# Patient Record
Sex: FEMALE | Race: AMERICAN INDIAN OR ALASKA NATIVE | NOT HISPANIC OR LATINO | URBAN - METROPOLITAN AREA
[De-identification: names, ages, dates, MRNs, and addresses within clinical notes are randomized per-mention and may not be internally consistent; named-entity substitution may affect disease eponyms.]

---

## 2021-09-16 ENCOUNTER — OFFICE VISIT (OUTPATIENT)
Dept: URBAN - METROPOLITAN AREA CLINIC 60 | Facility: CLINIC | Age: 31
End: 2021-09-16

## 2021-09-21 ENCOUNTER — LAB OUTSIDE AN ENCOUNTER (OUTPATIENT)
Dept: URBAN - METROPOLITAN AREA CLINIC 121 | Facility: CLINIC | Age: 31
End: 2021-09-21

## 2021-09-30 ENCOUNTER — OFFICE VISIT (OUTPATIENT)
Dept: URBAN - METROPOLITAN AREA SURGERY CENTER 4 | Facility: SURGERY CENTER | Age: 31
End: 2021-09-30

## 2021-09-30 LAB
A/G RATIO: 1.7
ALBUMIN: (no result)
ALKALINE PHOSPHATASE: (no result)
ALT (SGPT): (no result)
AST (SGOT): (no result)
BASO (ABSOLUTE): (no result)
BASOS: (no result)
BILIRUBIN, TOTAL: (no result)
BUN/CREATININE RATIO: 18
BUN: (no result)
CALCIUM: (no result)
CARBON DIOXIDE, TOTAL: (no result)
CHLORIDE: (no result)
CREATININE: (no result)
EGFR IF AFRICN AM: (no result)
EGFR IF NONAFRICN AM: (no result)
EOS (ABSOLUTE): (no result)
EOS: (no result)
GLOBULIN, TOTAL: (no result)
GLUCOSE: (no result)
HEMATOCRIT: (no result)
HEMATOLOGY COMMENTS:: (no result)
HEMOGLOBIN: (no result)
IMMATURE CELLS: (no result)
IMMATURE GRANS (ABS): (no result)
IMMATURE GRANULOCYTES: (no result)
LYMPHS (ABSOLUTE): (no result)
LYMPHS: (no result)
MCH: (no result)
MCHC: (no result)
MCV: (no result)
MONOCYTES(ABSOLUTE): (no result)
MONOCYTES: (no result)
NEUTROPHILS (ABSOLUTE): (no result)
NEUTROPHILS: (no result)
NRBC: (no result)
PLATELETS: (no result)
POTASSIUM: (no result)
PROTEIN, TOTAL: (no result)
RBC: (no result)
RDW: (no result)
SODIUM: (no result)
WBC: (no result)

## 2021-10-04 LAB — PATHOLOGY (INDENTED REPORT): (no result)

## 2021-10-14 ENCOUNTER — OFFICE VISIT (OUTPATIENT)
Dept: URBAN - METROPOLITAN AREA CLINIC 60 | Facility: CLINIC | Age: 31
End: 2021-10-14

## 2021-11-18 ENCOUNTER — OFFICE VISIT (OUTPATIENT)
Dept: URBAN - METROPOLITAN AREA CLINIC 60 | Facility: CLINIC | Age: 31
End: 2021-11-18

## 2021-11-30 ENCOUNTER — OFFICE VISIT (OUTPATIENT)
Dept: URBAN - METROPOLITAN AREA SURGERY CENTER 4 | Facility: SURGERY CENTER | Age: 31
End: 2021-11-30

## 2021-12-16 ENCOUNTER — OFFICE VISIT (OUTPATIENT)
Dept: URBAN - METROPOLITAN AREA CLINIC 60 | Facility: CLINIC | Age: 31
End: 2021-12-16

## 2022-07-09 ENCOUNTER — TELEPHONE ENCOUNTER (OUTPATIENT)
Dept: URBAN - METROPOLITAN AREA CLINIC 121 | Facility: CLINIC | Age: 32
End: 2022-07-09

## 2022-07-09 RX ORDER — PANTOPRAZOLE SODIUM 40 MG/1
TAKE ONCE DAILY PRIOR TO EVENING MEAL TABLET, DELAYED RELEASE ORAL TAKE AS DIRECTED
Refills: 0 | OUTPATIENT
Start: 2021-09-16 | End: 2021-12-10

## 2022-07-09 RX ORDER — OMEPRAZOLE 20 MG/1
CAPSULE, DELAYED RELEASE ORAL TAKE AS DIRECTED
Refills: 0 | OUTPATIENT
Start: 2018-02-26 | End: 2021-10-14

## 2022-07-09 RX ORDER — OMEPRAZOLE 20 MG/1
CAPSULE, DELAYED RELEASE ORAL TAKE AS DIRECTED
Refills: 0 | OUTPATIENT
Start: 2021-10-14 | End: 2021-10-14

## 2022-07-09 RX ORDER — OMEPRAZOLE 20 MG/1
CAPSULE, DELAYED RELEASE ORAL TAKE AS DIRECTED
Refills: 0 | OUTPATIENT
Start: 2018-01-24 | End: 2018-02-26

## 2022-07-09 RX ORDER — FAMOTIDINE 20 MG/1
ONCE A DAY 30-60 MINUTES PRIOR TO BREAKFAST TABLET ORAL ONCE A DAY
Refills: 0 | OUTPATIENT
Start: 2021-09-16 | End: 2021-12-10

## 2022-07-10 ENCOUNTER — TELEPHONE ENCOUNTER (OUTPATIENT)
Dept: URBAN - METROPOLITAN AREA CLINIC 121 | Facility: CLINIC | Age: 32
End: 2022-07-10

## 2022-07-10 RX ORDER — FAMOTIDINE 20 MG/1
ONCE A DAY TABLET ORAL ONCE A DAY
Refills: 2 | Status: ACTIVE | COMMUNITY
Start: 2021-12-16

## 2022-07-10 RX ORDER — FAMOTIDINE 20 MG/1
ONCE A DAY TABLET ORAL ONCE A DAY
Refills: 0 | Status: ACTIVE | COMMUNITY
Start: 2021-12-10

## 2022-07-10 RX ORDER — PANTOPRAZOLE SODIUM 40 MG/1
ONCE A DAY TABLET, DELAYED RELEASE ORAL ONCE A DAY
Refills: 0 | Status: ACTIVE | COMMUNITY
Start: 2021-12-10

## 2022-08-24 ENCOUNTER — APPOINTMENT (RX ONLY)
Dept: URBAN - METROPOLITAN AREA CLINIC 335 | Facility: CLINIC | Age: 32
Setting detail: DERMATOLOGY
End: 2022-08-24

## 2022-08-24 DIAGNOSIS — L30.1 DYSHIDROSIS [POMPHOLYX]: ICD-10-CM | Status: INADEQUATELY CONTROLLED

## 2022-08-24 DIAGNOSIS — L21.8 OTHER SEBORRHEIC DERMATITIS: ICD-10-CM | Status: INADEQUATELY CONTROLLED

## 2022-08-24 PROCEDURE — ? RECOMMENDATIONS

## 2022-08-24 PROCEDURE — 99204 OFFICE O/P NEW MOD 45 MIN: CPT

## 2022-08-24 PROCEDURE — ? COUNSELING

## 2022-08-24 PROCEDURE — ? PRESCRIPTION

## 2022-08-24 RX ORDER — CLOBETASOL PROPIONATE 0.5 MG/G
OINTMENT TOPICAL
Qty: 45 | Refills: 1 | Status: ERX | COMMUNITY
Start: 2022-08-24

## 2022-08-24 RX ORDER — FLUOCINONIDE 0.5 MG/ML
SOLUTION TOPICAL
Qty: 60 | Refills: 3 | Status: ERX | COMMUNITY
Start: 2022-08-24

## 2022-08-24 RX ORDER — KETOCONAZOLE 20 MG/ML
SHAMPOO, SUSPENSION TOPICAL
Qty: 120 | Refills: 1 | Status: ERX | COMMUNITY
Start: 2022-08-24

## 2022-08-24 RX ORDER — HYDROCORTISONE 25 MG/G
CREAM TOPICAL
Qty: 30 | Refills: 1 | Status: ERX | COMMUNITY
Start: 2022-08-24

## 2022-08-24 RX ORDER — KETOCONAZOLE 20 MG/G
CREAM TOPICAL BID
Qty: 1 | Refills: 1 | Status: ERX | COMMUNITY
Start: 2022-08-24

## 2022-08-24 RX ADMIN — KETOCONAZOLE: 20 SHAMPOO, SUSPENSION TOPICAL at 00:00

## 2022-08-24 RX ADMIN — KETOCONAZOLE: 20 CREAM TOPICAL at 00:00

## 2022-08-24 RX ADMIN — HYDROCORTISONE: 25 CREAM TOPICAL at 00:00

## 2022-08-24 RX ADMIN — FLUOCINONIDE: 0.5 SOLUTION TOPICAL at 00:00

## 2022-08-24 RX ADMIN — CLOBETASOL PROPIONATE: 0.5 OINTMENT TOPICAL at 00:00

## 2022-08-24 ASSESSMENT — LOCATION ZONE DERM
LOCATION ZONE: HAND
LOCATION ZONE: SCALP
LOCATION ZONE: FACE

## 2022-08-24 ASSESSMENT — LOCATION DETAILED DESCRIPTION DERM
LOCATION DETAILED: HAIR
LOCATION DETAILED: RIGHT RADIAL DORSAL HAND
LOCATION DETAILED: RIGHT MEDIAL EYEBROW
LOCATION DETAILED: LEFT DORSAL RING METACARPOPHALANGEAL JOINT

## 2022-08-24 ASSESSMENT — LOCATION SIMPLE DESCRIPTION DERM
LOCATION SIMPLE: RIGHT EYEBROW
LOCATION SIMPLE: HAIR
LOCATION SIMPLE: LEFT HAND
LOCATION SIMPLE: RIGHT HAND

## 2022-08-24 NOTE — PROCEDURE: RECOMMENDATIONS
Detail Level: Zone
Render Risk Assessment In Note?: no
Recommendation Preamble: The following recommendations were made during the visit:follow up with PCP to rule out possible arthritis

## 2022-10-05 ENCOUNTER — APPOINTMENT (RX ONLY)
Dept: URBAN - METROPOLITAN AREA CLINIC 335 | Facility: CLINIC | Age: 32
Setting detail: DERMATOLOGY
End: 2022-10-05

## 2022-10-05 ENCOUNTER — RX ONLY (OUTPATIENT)
Age: 32
Setting detail: RX ONLY
End: 2022-10-05

## 2022-10-05 DIAGNOSIS — L21.8 OTHER SEBORRHEIC DERMATITIS: ICD-10-CM | Status: IMPROVED

## 2022-10-05 DIAGNOSIS — L30.1 DYSHIDROSIS [POMPHOLYX]: ICD-10-CM | Status: INADEQUATELY CONTROLLED

## 2022-10-05 PROCEDURE — 99214 OFFICE O/P EST MOD 30 MIN: CPT

## 2022-10-05 PROCEDURE — ? PRESCRIPTION MEDICATION MANAGEMENT

## 2022-10-05 PROCEDURE — ? RECOMMENDATIONS

## 2022-10-05 PROCEDURE — ? COUNSELING

## 2022-10-05 RX ORDER — KETOCONAZOLE 20 MG/ML
SHAMPOO, SUSPENSION TOPICAL
Qty: 120 | Refills: 1 | Status: ERX

## 2022-10-05 ASSESSMENT — LOCATION ZONE DERM
LOCATION ZONE: SCALP
LOCATION ZONE: FACE
LOCATION ZONE: HAND

## 2022-10-05 ASSESSMENT — LOCATION SIMPLE DESCRIPTION DERM
LOCATION SIMPLE: RIGHT HAND
LOCATION SIMPLE: HAIR
LOCATION SIMPLE: LEFT HAND
LOCATION SIMPLE: RIGHT EYEBROW

## 2022-10-05 ASSESSMENT — LOCATION DETAILED DESCRIPTION DERM
LOCATION DETAILED: RIGHT RADIAL DORSAL HAND
LOCATION DETAILED: LEFT DORSAL RING METACARPOPHALANGEAL JOINT
LOCATION DETAILED: HAIR
LOCATION DETAILED: RIGHT MEDIAL EYEBROW

## 2022-10-05 NOTE — PROCEDURE: PRESCRIPTION MEDICATION MANAGEMENT
Detail Level: Zone
Render In Strict Bullet Format?: No
Continue Regimen: Ketoconazole shampoo - tiw \\nKetoconazole cream - bid\\nFluocinonide- tiw
Continue Regimen: Clobetasol diluted with moisterizer - tiw\\nHydrocortisone- bid tiw

## 2023-03-07 ENCOUNTER — RX ONLY (OUTPATIENT)
Age: 33
Setting detail: RX ONLY
End: 2023-03-07

## 2023-03-07 RX ORDER — KETOCONAZOLE 20 MG/ML
SHAMPOO, SUSPENSION TOPICAL
Qty: 120 | Refills: 3 | Status: ERX

## 2023-12-04 ENCOUNTER — OFFICE VISIT (OUTPATIENT)
Dept: URBAN - METROPOLITAN AREA CLINIC 60 | Facility: CLINIC | Age: 33
End: 2023-12-04
Payer: COMMERCIAL

## 2023-12-04 VITALS
TEMPERATURE: 98.6 F | BODY MASS INDEX: 23.41 KG/M2 | WEIGHT: 127.2 LBS | OXYGEN SATURATION: 98 % | DIASTOLIC BLOOD PRESSURE: 82 MMHG | HEIGHT: 62 IN | RESPIRATION RATE: 12 BRPM | HEART RATE: 71 BPM | SYSTOLIC BLOOD PRESSURE: 102 MMHG

## 2023-12-04 DIAGNOSIS — R10.13 DYSPEPSIA: ICD-10-CM

## 2023-12-04 DIAGNOSIS — R74.8 ELEVATED LIVER ENZYMES: ICD-10-CM

## 2023-12-04 DIAGNOSIS — K52.9 COLITIS: ICD-10-CM

## 2023-12-04 DIAGNOSIS — R10.84 GENERALIZED ABDOMINAL PAIN: ICD-10-CM

## 2023-12-04 PROBLEM — 40739000: Status: ACTIVE | Noted: 2023-12-04

## 2023-12-04 PROCEDURE — 99214 OFFICE O/P EST MOD 30 MIN: CPT

## 2023-12-04 RX ORDER — ONDANSETRON HYDROCHLORIDE 4 MG/1
1 TABLET TABLET, FILM COATED ORAL
Qty: 2 | Refills: 0 | OUTPATIENT
Start: 2023-12-04

## 2023-12-04 RX ORDER — SACCHAROMYCES BOULARDII 250 MG
AS DIRECTED CAPSULE ORAL
Status: ACTIVE | COMMUNITY

## 2023-12-04 NOTE — HPI-TODAY'S VISIT:
Renée is a 33-year-old female presenting to the office today for evaluation of colitis and dysphagia. She was seen in the hospital last month for abdominal pain and CT showed colitis. She was treated with metronidazole and ciprofloxacin. Overall, symptoms are better since treatment but not resolved. She still has some abdominal pain but diarrhea has completely subsided. She describes the abdominal pain as sharp/cramping in nature and it is mostly located in her abdomen. She has not noticed anything that makes symptoms worse or better. She also complains of dysphagia mostly with solid foods but sometimes liquids as well. During hospital visit she also had elevated liver enzymes. She also admits to pruritus ani.she also complains of bloating/indigestion is not aware of alleviating or aggravate factors she is not currently experiencing any other symptoms and has no other complaints, questions, concerns. She denies jaundice, pruritus, acholic stools, dark urine, diarrhea/constipation, hematochezia, melena, blood when wiping, fatigue, pallor nausea/vomiting, hematemesis, odynophagia.

## 2023-12-11 ENCOUNTER — LAB OUTSIDE AN ENCOUNTER (OUTPATIENT)
Dept: URBAN - METROPOLITAN AREA CLINIC 60 | Facility: CLINIC | Age: 33
End: 2023-12-11

## 2023-12-14 ENCOUNTER — WEB ENCOUNTER (OUTPATIENT)
Dept: URBAN - METROPOLITAN AREA CLINIC 60 | Facility: CLINIC | Age: 33
End: 2023-12-14

## 2023-12-15 ENCOUNTER — TELEPHONE ENCOUNTER (OUTPATIENT)
Dept: URBAN - METROPOLITAN AREA CLINIC 60 | Facility: CLINIC | Age: 33
End: 2023-12-15

## 2024-01-11 ENCOUNTER — CLAIMS CREATED FROM THE CLAIM WINDOW (OUTPATIENT)
Dept: URBAN - METROPOLITAN AREA CLINIC 4 | Facility: CLINIC | Age: 34
End: 2024-01-11
Payer: COMMERCIAL

## 2024-01-11 ENCOUNTER — CLAIMS CREATED FROM THE CLAIM WINDOW (OUTPATIENT)
Dept: URBAN - METROPOLITAN AREA SURGERY CENTER 4 | Facility: SURGERY CENTER | Age: 34
End: 2024-01-11
Payer: COMMERCIAL

## 2024-01-11 DIAGNOSIS — K20.0 EOSINOPHILIC ESOPHAGITIS: ICD-10-CM

## 2024-01-11 DIAGNOSIS — K63.89 OTHER SPECIFIED DISEASES OF INTESTINE: ICD-10-CM

## 2024-01-11 DIAGNOSIS — K29.70 GASTRITIS: ICD-10-CM

## 2024-01-11 DIAGNOSIS — K44.9 HIATAL HERNIA: ICD-10-CM

## 2024-01-11 DIAGNOSIS — R19.7 DIARRHEA, UNSPECIFIED TYPE: ICD-10-CM

## 2024-01-11 DIAGNOSIS — K64.0 FIRST DEGREE HEMORRHOIDS: ICD-10-CM

## 2024-01-11 DIAGNOSIS — K31.89 OTHER DISEASES OF STOMACH AND DUODENUM: ICD-10-CM

## 2024-01-11 DIAGNOSIS — K29.50 CHRONIC GASTRITIS WITHOUT BLEEDING, UNSPECIFIED GASTRITIS TYPE: ICD-10-CM

## 2024-01-11 DIAGNOSIS — K29.70 GASTRITIS, UNSPECIFIED, WITHOUT BLEEDING: ICD-10-CM

## 2024-01-11 DIAGNOSIS — R19.7 DIARRHEA: ICD-10-CM

## 2024-01-11 DIAGNOSIS — K44.9 DIAPHRAGMATIC HERNIA WITHOUT OBSTRUCTION OR GANGRENE: ICD-10-CM

## 2024-01-11 PROCEDURE — 88313 SPECIAL STAINS GROUP 2: CPT | Performed by: PATHOLOGY

## 2024-01-11 PROCEDURE — 88342 IMHCHEM/IMCYTCHM 1ST ANTB: CPT | Performed by: PATHOLOGY

## 2024-01-11 PROCEDURE — 88312 SPECIAL STAINS GROUP 1: CPT | Performed by: PATHOLOGY

## 2024-01-11 PROCEDURE — 88305 TISSUE EXAM BY PATHOLOGIST: CPT | Performed by: PATHOLOGY

## 2024-01-11 PROCEDURE — 45380 COLONOSCOPY AND BIOPSY: CPT | Performed by: INTERNAL MEDICINE

## 2024-01-11 PROCEDURE — 43239 EGD BIOPSY SINGLE/MULTIPLE: CPT | Performed by: INTERNAL MEDICINE

## 2024-01-11 PROCEDURE — 00813 ANES UPR LWR GI NDSC PX: CPT | Performed by: NURSE ANESTHETIST, CERTIFIED REGISTERED

## 2024-01-11 RX ORDER — SACCHAROMYCES BOULARDII 250 MG
AS DIRECTED CAPSULE ORAL
Status: ACTIVE | COMMUNITY

## 2024-01-11 RX ORDER — ONDANSETRON HYDROCHLORIDE 4 MG/1
1 TABLET TABLET, FILM COATED ORAL
Qty: 2 | Refills: 0 | Status: ACTIVE | COMMUNITY
Start: 2023-12-04

## 2024-01-22 ENCOUNTER — TELEPHONE ENCOUNTER (OUTPATIENT)
Dept: URBAN - METROPOLITAN AREA CLINIC 64 | Facility: CLINIC | Age: 34
End: 2024-01-22

## 2024-01-23 ENCOUNTER — OFFICE VISIT (OUTPATIENT)
Dept: URBAN - METROPOLITAN AREA CLINIC 63 | Facility: CLINIC | Age: 34
End: 2024-01-23

## 2024-01-23 ENCOUNTER — OFFICE VISIT (OUTPATIENT)
Dept: URBAN - METROPOLITAN AREA CLINIC 63 | Facility: CLINIC | Age: 34
End: 2024-01-23
Payer: COMMERCIAL

## 2024-01-23 VITALS
RESPIRATION RATE: 20 BRPM | DIASTOLIC BLOOD PRESSURE: 60 MMHG | HEART RATE: 69 BPM | WEIGHT: 129.2 LBS | BODY MASS INDEX: 23.77 KG/M2 | SYSTOLIC BLOOD PRESSURE: 100 MMHG | HEIGHT: 62 IN | TEMPERATURE: 97.6 F | OXYGEN SATURATION: 96 %

## 2024-01-23 DIAGNOSIS — K29.50 CHRONIC GASTRITIS WITHOUT BLEEDING, UNSPECIFIED GASTRITIS TYPE: ICD-10-CM

## 2024-01-23 DIAGNOSIS — R19.7 DIARRHEA, UNSPECIFIED TYPE: ICD-10-CM

## 2024-01-23 DIAGNOSIS — K21.9 GASTROESOPHAGEAL REFLUX DISEASE WITHOUT ESOPHAGITIS: ICD-10-CM

## 2024-01-23 DIAGNOSIS — K44.9 HIATAL HERNIA: ICD-10-CM

## 2024-01-23 PROBLEM — 266435005: Status: ACTIVE | Noted: 2024-01-23

## 2024-01-23 PROBLEM — 8493009: Status: ACTIVE | Noted: 2024-01-23

## 2024-01-23 PROCEDURE — 99214 OFFICE O/P EST MOD 30 MIN: CPT

## 2024-01-23 RX ORDER — POLYETHYLENE GLYCOL 3350 17 G/17G
1 SCOOP MIXED WITH 8 OUNCES OF FLUID POWDER, FOR SOLUTION ORAL ONCE A DAY
Status: ACTIVE | COMMUNITY

## 2024-01-23 RX ORDER — OMEPRAZOLE 20 MG/1
1 CAPSULE 30 MINUTES BEFORE MORNING MEAL CAPSULE, DELAYED RELEASE ORAL ONCE A DAY
Qty: 30 | Refills: 1 | OUTPATIENT
Start: 2024-01-23

## 2024-01-23 NOTE — HPI-TODAY'S VISIT:
Renée is a 33-year-old female presenting to the office today for follow-up after colonoscopy and EGD. Colonoscopy and EGD were completed on 1/11/2024 and results are discussed in detail below. At her last visit she was having abdominal pain along with dyspepsia/bloating and diarrhea. Symptoms have improved since her visit but she is still having some abdominal discomfort. This is intermittent and she describes it as a burning/aching pain in her upper abdomen. Her bowel movements have improved and she is normally going once per day, fully formed bowel movements. However, there are some days where she does have some constipation and MiraLAX will help. Otherwise, she has no complaints, questions, concerns and is doing well. She denies melena, hematochezia, bright red blood per rectum, nausea/vomiting, hematemesis, change in stool caliber, unintentional weight loss, dysphagia, change in stool caliber. . . EGD 1/11/2024; - Normal esophagus - 1 cm hiatal hernia - Chronic gastritis. Biopsied. - Normal duodenal bulb and second portion of the duodenum. Biopsied. - Biopsies were taken with a cold forceps for evaluation of eosinophilic esophagitis. - Pathology; - Second part duodenum biopsy; no significant abnormality - Stomach antrum and body biopsy; chemical/reactive gastropathy. No evidence of H. pylori organisms or intestinal metaplasia. Negative for dysplasia or malignancy. - Upper third, lower third esophagus biopsy; squamous mucosa with reflux type changes, no columnar mucosa identified. No evidence of Allen's esophagus or eosinophilic esophagitis. Negative for infectious organisms, dysplasia, malignancy . Colonoscopy 1/11/2024; - Nonbleeding internal hemorrhoids - The examined portion of the ileum was normal. Biopsied. - Pathology; - Terminal ileum biopsy; no significant abnormality - Colon random biopsy; no significant abnormality . Discussed 12/17/2023 labs . Discussed 12/17/2023 CT abdomen/pelvis

## 2024-03-06 ENCOUNTER — OV EP (OUTPATIENT)
Dept: URBAN - METROPOLITAN AREA CLINIC 63 | Facility: CLINIC | Age: 34
End: 2024-03-06
Payer: COMMERCIAL

## 2024-03-06 VITALS
HEIGHT: 62 IN | TEMPERATURE: 97.8 F | HEART RATE: 72 BPM | DIASTOLIC BLOOD PRESSURE: 72 MMHG | BODY MASS INDEX: 24.29 KG/M2 | WEIGHT: 132 LBS | OXYGEN SATURATION: 98 % | SYSTOLIC BLOOD PRESSURE: 108 MMHG

## 2024-03-06 DIAGNOSIS — R74.8 ELEVATED LIVER ENZYMES: ICD-10-CM

## 2024-03-06 DIAGNOSIS — K29.50 CHRONIC GASTRITIS WITHOUT BLEEDING, UNSPECIFIED GASTRITIS TYPE: ICD-10-CM

## 2024-03-06 DIAGNOSIS — K44.9 HIATAL HERNIA: ICD-10-CM

## 2024-03-06 DIAGNOSIS — R19.7 DIARRHEA, UNSPECIFIED TYPE: ICD-10-CM

## 2024-03-06 DIAGNOSIS — K21.9 GASTROESOPHAGEAL REFLUX DISEASE, UNSPECIFIED WHETHER ESOPHAGITIS PRESENT: ICD-10-CM

## 2024-03-06 PROBLEM — 235595009: Status: ACTIVE | Noted: 2024-03-06

## 2024-03-06 PROCEDURE — 99213 OFFICE O/P EST LOW 20 MIN: CPT

## 2024-03-06 RX ORDER — FAMOTIDINE 20 MG/1
1 TABLET AT BEDTIME TABLET, FILM COATED ORAL ONCE A DAY
Qty: 90 TABLET | Refills: 0 | OUTPATIENT
Start: 2024-03-06

## 2024-03-06 RX ORDER — OMEPRAZOLE 40 MG/1
TAKE 1 CAPSULE BY MOUTH EVERY DAY 30 MINUTES BEFORE BREAKFAST CAPSULE, DELAYED RELEASE ORAL
Qty: 90 CAPSULE | Refills: 0 | Status: ACTIVE | COMMUNITY

## 2024-03-06 RX ORDER — POLYETHYLENE GLYCOL 3350 17 G/17G
1 SCOOP MIXED WITH 8 OUNCES OF FLUID POWDER, FOR SOLUTION ORAL ONCE A DAY
Status: ACTIVE | COMMUNITY

## 2024-03-06 RX ORDER — CALCIUM CARBONATE 500 MG/1
1 TABLET TABLET ORAL ONCE A DAY
Status: ACTIVE | COMMUNITY

## 2024-03-06 NOTE — HPI-TODAY'S VISIT:
Renée is a 33-year-old female presenting to the office today for follow-up on GERD/chronic gastritis. She states omeprazole 40 mg has helped symptoms but she is still experiencing reflux multiple times per week. She does adhere to lifestyle modifications and despite lifestyle modifications and PPI use she is still having symptoms. She does note that spicy and acidic foods make it worse but she tries to stay away from these. She has no other GI complaints, questions, concerns her bowel movements have improved and she is no longer having diarrhea. She is normally having 1 bowel movement per day that is fully formed. She denies melena, hematochezia, bright red blood per rectum, nausea/vomiting, hematemesis, dysphagia, abdominal pain, constipation, diarrhea, change in stool caliber, unintentional weight loss/weight gain. . . EGD 1/11/2024; - Normal esophagus - 1 cm hiatal hernia - Chronic gastritis. Biopsied. - Normal duodenal bulb and second portion of the duodenum. Biopsied. - Biopsies were taken with a cold forceps for evaluation of eosinophilic esophagitis. - Pathology; - Second part duodenum biopsy; no significant abnormality - Stomach antrum and body biopsy; chemical/reactive gastropathy. No evidence of H. pylori organisms or intestinal metaplasia. Negative for dysplasia or malignancy. - Upper third, lower third esophagus biopsy; squamous mucosa with reflux type changes, no columnar mucosa identified. No evidence of Allen's esophagus or eosinophilic esophagitis. Negative for infectious organisms, dysplasia, malignancy . Colonoscopy 1/11/2024; - Nonbleeding internal hemorrhoids - The examined portion of the ileum was normal. Biopsied. - Pathology; - Terminal ileum biopsy; no significant abnormality - Colon random biopsy; no significant abnormality . . 12/17/2023 labs; - CBC within normal limits - CMP; total protein 8.4 . 11/9/2023 labs; - CBC, iron studies within normal limits - CMP; glucose 103 - Lipid panel; triglycerides 154 - Thyroid panel within normal limits - Indirect bilirubin low - Vitamin B12/folate within normal limits - Hemoglobin A1c within normal limits - Vitamin D within normal limits - Amylase, lipase, GGT, ferritin, magnesium, creatine within normal limits . CT abdomen/pelvis 12/17/2023; - Impression; - Postsurgical changes as described. No evidence for focal inflammatory or obstructive process in the abdomen/pelvis . 11/10/2023 CT abdomen/pelvis; - Impression; 1. No acute abdominal or pelvic findings. No CT findings to account for patient's symptoms. 2. Cholecystectomy. 3. Hysterectomy. 4. Left renal vein embolization . 11/20/2023 ultrasound pelvis/transvaginal ultrasound/pelvic duplex sonogram; - Impression; - Status post hysterectomy. Otherwise normal level and morphology to the bilateral ovaries . 11/20/2023 CT abdomen/pelvis; - Impression; - Diffuse bowel wall thickening in the sigmoid colon suggesting long segment colitis. High attenuation material throughout the appendix representing possible phleboliths or possible retained oral contrast from prior upper GI or barium enema examination. Inflammatory change around it seen. Status post cauterization of the loop. The ovarian vein in the left hemipelvis and left lower abdomen. Status postcholecystectomy

## 2024-05-10 ENCOUNTER — TELEPHONE ENCOUNTER (OUTPATIENT)
Dept: URBAN - METROPOLITAN AREA CLINIC 63 | Facility: CLINIC | Age: 34
End: 2024-05-10

## 2024-05-10 RX ORDER — DICYCLOMINE HYDROCHLORIDE 10 MG/1
1 CAPSULE 30 MINUTES BEFORE EATING CAPSULE ORAL THREE TIMES A DAY
Qty: 270 CAPSULE | Refills: 3 | OUTPATIENT
Start: 2024-05-13 | End: 2025-05-08

## 2024-06-14 ENCOUNTER — ERX REFILL RESPONSE (OUTPATIENT)
Dept: URBAN - METROPOLITAN AREA CLINIC 63 | Facility: CLINIC | Age: 34
End: 2024-06-14

## 2024-06-14 RX ORDER — OMEPRAZOLE 40 MG/1
TAKE 1 CAPSULE BY MOUTH EVERY DAY 30 MINUTES BEFORE BREAKFAST CAPSULE, DELAYED RELEASE ORAL
Qty: 90 CAPSULE | Refills: 0 | OUTPATIENT

## 2024-06-26 ENCOUNTER — OFFICE VISIT (OUTPATIENT)
Dept: URBAN - METROPOLITAN AREA CLINIC 63 | Facility: CLINIC | Age: 34
End: 2024-06-26

## 2025-05-16 ENCOUNTER — P2P PATIENT RECORD (OUTPATIENT)
Age: 35
End: 2025-05-16

## 2025-05-20 ENCOUNTER — TELEPHONE ENCOUNTER (OUTPATIENT)
Dept: URBAN - METROPOLITAN AREA CLINIC 63 | Facility: CLINIC | Age: 35
End: 2025-05-20